# Patient Record
Sex: FEMALE | Race: OTHER
[De-identification: names, ages, dates, MRNs, and addresses within clinical notes are randomized per-mention and may not be internally consistent; named-entity substitution may affect disease eponyms.]

---

## 2021-02-10 ENCOUNTER — HOSPITAL ENCOUNTER (EMERGENCY)
Dept: HOSPITAL 72 - EMR | Age: 31
Discharge: HOME | End: 2021-02-10
Payer: MEDICARE

## 2021-02-10 VITALS — BODY MASS INDEX: 22.16 KG/M2 | HEIGHT: 65 IN | WEIGHT: 133 LBS

## 2021-02-10 VITALS — SYSTOLIC BLOOD PRESSURE: 129 MMHG | DIASTOLIC BLOOD PRESSURE: 89 MMHG

## 2021-02-10 DIAGNOSIS — N83.201: Primary | ICD-10-CM

## 2021-02-10 DIAGNOSIS — D72.829: ICD-10-CM

## 2021-02-10 LAB
ADD MANUAL DIFF: NO
ALBUMIN SERPL-MCNC: 4.1 G/DL (ref 3.4–5)
ALBUMIN/GLOB SERPL: 1.2 {RATIO} (ref 1–2.7)
ALP SERPL-CCNC: 68 U/L (ref 46–116)
ALT SERPL-CCNC: 44 U/L (ref 12–78)
ANION GAP SERPL CALC-SCNC: 9 MMOL/L (ref 5–15)
APPEARANCE UR: (no result)
APTT PPP: (no result) S
AST SERPL-CCNC: 22 U/L (ref 15–37)
BASOPHILS NFR BLD AUTO: 0.3 % (ref 0–2)
BILIRUB SERPL-MCNC: 0.5 MG/DL (ref 0.2–1)
BUN SERPL-MCNC: 8 MG/DL (ref 7–18)
CALCIUM SERPL-MCNC: 8.6 MG/DL (ref 8.5–10.1)
CHLORIDE SERPL-SCNC: 106 MMOL/L (ref 98–107)
CO2 SERPL-SCNC: 27 MMOL/L (ref 21–32)
CREAT SERPL-MCNC: 0.6 MG/DL (ref 0.55–1.3)
EOSINOPHIL NFR BLD AUTO: 0.8 % (ref 0–3)
ERYTHROCYTE [DISTWIDTH] IN BLOOD BY AUTOMATED COUNT: 12.9 % (ref 11.6–14.8)
GLOBULIN SER-MCNC: 3.5 G/DL
GLUCOSE UR STRIP-MCNC: NEGATIVE MG/DL
HCT VFR BLD CALC: 41.6 % (ref 37–47)
HGB BLD-MCNC: 13.6 G/DL (ref 12–16)
KETONES UR QL STRIP: NEGATIVE
LEUKOCYTE ESTERASE UR QL STRIP: NEGATIVE
LYMPHOCYTES NFR BLD AUTO: 8 % (ref 20–45)
MCV RBC AUTO: 87 FL (ref 80–99)
MONOCYTES NFR BLD AUTO: 4.7 % (ref 1–10)
NEUTROPHILS NFR BLD AUTO: 86.2 % (ref 45–75)
NITRITE UR QL STRIP: NEGATIVE
PH UR STRIP: 6 [PH] (ref 4.5–8)
PLATELET # BLD: 229 K/UL (ref 150–450)
POTASSIUM SERPL-SCNC: 3.6 MMOL/L (ref 3.5–5.1)
PROT UR QL STRIP: NEGATIVE
RBC # BLD AUTO: 4.8 M/UL (ref 4.2–5.4)
SODIUM SERPL-SCNC: 142 MMOL/L (ref 136–145)
SP GR UR STRIP: 1.01 (ref 1–1.03)
UROBILINOGEN UR-MCNC: NORMAL MG/DL (ref 0–1)
WBC # BLD AUTO: 19.4 K/UL (ref 4.8–10.8)

## 2021-02-10 PROCEDURE — 80053 COMPREHEN METABOLIC PANEL: CPT

## 2021-02-10 PROCEDURE — 81003 URINALYSIS AUTO W/O SCOPE: CPT

## 2021-02-10 PROCEDURE — 80307 DRUG TEST PRSMV CHEM ANLYZR: CPT

## 2021-02-10 PROCEDURE — 83690 ASSAY OF LIPASE: CPT

## 2021-02-10 PROCEDURE — 36415 COLL VENOUS BLD VENIPUNCTURE: CPT

## 2021-02-10 PROCEDURE — 76856 US EXAM PELVIC COMPLETE: CPT

## 2021-02-10 PROCEDURE — 83735 ASSAY OF MAGNESIUM: CPT

## 2021-02-10 PROCEDURE — 99284 EMERGENCY DEPT VISIT MOD MDM: CPT

## 2021-02-10 PROCEDURE — 96374 THER/PROPH/DIAG INJ IV PUSH: CPT

## 2021-02-10 PROCEDURE — 96361 HYDRATE IV INFUSION ADD-ON: CPT

## 2021-02-10 PROCEDURE — 74177 CT ABD & PELVIS W/CONTRAST: CPT

## 2021-02-10 PROCEDURE — 85025 COMPLETE CBC W/AUTO DIFF WBC: CPT

## 2021-02-10 PROCEDURE — 96375 TX/PRO/DX INJ NEW DRUG ADDON: CPT

## 2021-02-10 PROCEDURE — 76830 TRANSVAGINAL US NON-OB: CPT

## 2021-02-10 PROCEDURE — 81025 URINE PREGNANCY TEST: CPT

## 2021-02-10 NOTE — DIAGNOSTIC IMAGING REPORT
Indication: Pelvic pain, pregnancy test pending

 

Technique: Transabdominal and transvaginal images of the pelvis Doppler interrogation

of the ovaries

 

Comparison: none

 

Findings: Uterus is retroverted, measures 6.9 cm length by 3.6 cm AP. No intrauterine

pregnancy demonstrated. 9 mm thick endometrium. The ovaries are normal in size and

configuration, demonstrate normal Doppler signal. There is equivocal trace free

cul-de-sac fluid

 

Impression: No intrauterine pregnancy demonstrated. Should pregnancy test result in

being positive, differential considerations include very early pregnancy, spontaneous

, ectopic pregnancy.

 

Otherwise essentially unremarkable exam

## 2021-02-10 NOTE — EMERGENCY ROOM REPORT
History of Present Illness


General


Chief Complaint:  Abdominal Pain


Source:  Patient





Present Illness


HPI


Patient is a 30-year-old female denies any significant past medical history who 

presents to the ER co abdominal pain, nausea, non-bloody vomitus x 2 days.  She 

states that the pain is on the right lower side and is worried that it is coming

from her ovaries.  She denies fever but co chills.  She co mild generalized HA, 

no blurry vision, no focal weakness.  Denies chest pain, sob, or cough.  She 

denies any sick contacts.  LMP 2 weeks ago.  Denies dysuria or hematuria.   HO 

appendectomy.


Allergies:  


Coded Allergies:  


     No Known Allergies (Unverified , 2/10/21)





COVID-19 Screening


Contact w/high risk pt:  No


Experienced COVID-19 symptoms?:  Yes


COVID-19 Testing performed PTA:  Yes


COVID-19 Screening:  Negative COVID-19


COVID-19 Testing Source:  nasal





Patient History


Last Menstrual Period:  last week


Pregnant Now:  No


Reviewed Nursing Documentation:  PMH: Agreed; PSxH: Agreed





Nursing Documentation-PMH


Past Medical History:  No Stated History





Review of Systems


All Other Systems:  negative except mentioned in HPI





Physical Exam





Vital Signs








  Date Time  Temp Pulse Resp B/P (MAP) Pulse Ox O2 Delivery O2 Flow Rate FiO2


 


2/10/21 14:09 98.8 105 21 129/89 (102) 95 Room Air  








Sp02 EP Interpretation:  reviewed, normal


General Appearance:  alert, GCS 15, non-toxic, mild distress, other - tearful


Head:  normocephalic, atraumatic


Eyes:  bilateral eye normal inspection, bilateral eye PERRL


ENT:  hearing grossly normal, normal pharynx, no angioedema, normal voice


Neck:  full range of motion, supple/symm/no masses


Respiratory:  chest non-tender, lungs clear, normal breath sounds, speaking full

sentences


Cardiovascular #1:  regular rate, rhythm, no edema


Gastrointestinal:  other - Right suprapubic tenderness to palpation with no 

guarding or rebound tenderness


Rectal:  deferred


Musculoskeletal:  normal range of motion


Neurologic:  CNs III-XII nml as tested, oriented x3


Psychiatric:  no suicidal/homicidal ideation


Skin:  no rash


Lymphatic:  no adenopathy





Medical Decision Making


Diagnostic Impression:  


   Primary Impression:  


   Ovarian cyst


   Additional Impression:  


   Leukocytosis


ER Course


Patient presents with lower abdominal pain and leukocytosis.  Ultrasound 

demonstrates ovarian cyst no evidence for torsion.  Patient has free fluid 

within the cul-de-sac.  Likely ruptured cyst causing patient's pain.  Patient 

given IV pain medication as well as antiemetics.  Patient advised to follow-up 

with OB/GYN.  After discussing risks and benefits of further diagnostics, 

treatment plans, as well as indications for and risks of admission, the patient 

is agreeable to being discharged home.  I have explained that their evaluation 

and treatment in the emergency department today is an important step towards 

them achieving better health but that their evaluation today is not intended to 

replace further evaluation and treatment by a physician in their local clinic.  

I have explained that while the current findings suggest no immediate life 

threatening emergency they will require further evaluation and treatment by a 

physician of their choice in their area.  They understand that it will be 

necessary for them to review the final reports of their ED visit with their 

clinic physician.  We have reviewed indications for return to the Emergency 

Department.  I have explained that additional time may need to pass and/or 

additional testing as an outpatient may be necessary before a definitive 

diagnosis can be made.  They tell me they are willing to follow up as instructed

within the timeframe I recommend.  They appear to understand what we discussed. 

Additionally they understand that if they are unable to be seen by an outpatient

physician they are welcome, and in fact should, return to the Emergency 

Department for a repeat evaluation.  The patient is stable at time of discharge.





Laboratory Tests








Test


 2/10/21


14:20


 


White Blood Count


 19.4 K/UL


(4.8-10.8)  H


 


Red Blood Count


 4.80 M/UL


(4.20-5.40)


 


Hemoglobin


 13.6 G/DL


(12.0-16.0)


 


Hematocrit


 41.6 %


(37.0-47.0)


 


Mean Corpuscular Volume 87 FL (80-99)  


 


Mean Corpuscular Hemoglobin


 28.3 PG


(27.0-31.0)


 


Mean Corpuscular Hemoglobin


Concent 32.7 G/DL


(32.0-36.0)


 


Red Cell Distribution Width


 12.9 %


(11.6-14.8)


 


Platelet Count


 229 K/UL


(150-450)


 


Mean Platelet Volume


 10.1 FL


(6.5-10.1)


 


Neutrophils (%) (Auto)


 86.2 %


(45.0-75.0)  H


 


Lymphocytes (%) (Auto)


 8.0 %


(20.0-45.0)  L


 


Monocytes (%) (Auto)


 4.7 %


(1.0-10.0)


 


Eosinophils (%) (Auto)


 0.8 %


(0.0-3.0)


 


Basophils (%) (Auto)


 0.3 %


(0.0-2.0)


 


Urine Color Pale yellow  


 


Urine Appearance


 Slightly


cloudy


 


Urine pH 6 (4.5-8.0)  


 


Urine Specific Gravity


 1.010


(1.005-1.035)


 


Urine Protein


 Negative


(NEGATIVE)


 


Urine Glucose (UA)


 Negative


(NEGATIVE)


 


Urine Ketones


 Negative


(NEGATIVE)


 


Urine Blood


 2+ (NEGATIVE)


H


 


Urine Nitrite


 Negative


(NEGATIVE)


 


Urine Bilirubin


 Negative


(NEGATIVE)


 


Urine Urobilinogen


 Normal MG/DL


(0.0-1.0)


 


Urine Leukocyte Esterase


 Negative


(NEGATIVE)


 


Urine RBC


 0-2 /HPF (0 -


2)


 


Urine WBC


 0 /HPF (0 - 2)





 


Urine Squamous Epithelial


Cells Few /LPF


(NONE/OCC)


 


Urine Bacteria


 Few /HPF


(NONE)


 


Urine HCG, Qualitative


 Negative


(NEGATIVE)


 


Sodium Level


 142 MMOL/L


(136-145)


 


Potassium Level


 3.6 MMOL/L


(3.5-5.1)


 


Chloride Level


 106 MMOL/L


()


 


Carbon Dioxide Level


 27 MMOL/L


(21-32)


 


Anion Gap


 9 mmol/L


(5-15)


 


Blood Urea Nitrogen


 8 mg/dL (7-18)





 


Creatinine


 0.6 MG/DL


(0.55-1.30)


 


Estimated Glomerular


Filtration Rate > 60 mL/min


(>60)


 


Glucose Level


 84 MG/DL


()


 


Calcium Level


 8.6 MG/DL


(8.5-10.1)


 


Magnesium Level


 1.8 MG/DL


(1.8-2.4)


 


Total Bilirubin


 0.5 MG/DL


(0.2-1.0)


 


Aspartate Amino Transferase


(AST) 22 U/L (15-37)





 


Alanine Aminotransferase (ALT)


 44 U/L (12-78)





 


Alkaline Phosphatase


 68 U/L


()


 


Total Protein


 7.6 G/DL


(6.4-8.2)


 


Albumin


 4.1 G/DL


(3.4-5.0)


 


Globulin 3.5 g/dL  


 


Albumin/Globulin Ratio 1.2 (1.0-2.7)  


 


Lipase


 196 U/L


()


 


Urine Opiates Screen


 Negative


(NEGATIVE)


 


Urine Barbiturates Screen


 Negative


(NEGATIVE)


 


Phencyclidine (PCP) Screen


 Negative


(NEGATIVE)


 


Urine Amphetamines Screen


 Negative


(NEGATIVE)


 


Urine Benzodiazepines Screen


 Negative


(NEGATIVE)


 


Urine Cocaine Screen


 Negative


(NEGATIVE)


 


Urine Marijuana (THC) Screen


 Positive


(NEGATIVE)  H








Rhythm Strip Diag. Results


Rhythm Strip Time:  14:33


EP Interpretation:  yes - Lalitha Quach MD


Rate:  79 bpm


Rhythm:  NSR, no PVC's, no ectopy





Last Vital Signs








  Date Time  Temp Pulse Resp B/P (MAP) Pulse Ox O2 Delivery O2 Flow Rate FiO2


 


2/10/21 14:09 98.8 105 21 129/89 (102) 95 Room Air  








Disposition:  HOME, SELF-CARE


Condition:  Stable


Scripts


Oxycodone/Acetaminophen 5-325* (PERCOCET 5-325 MG TABLET*) 1 Each Tablet


1 TAB ORAL Q4H PRN for For Pain, #10 TAB 0 Refills


   Prov: Lalitha Quach M.D.         2/10/21 


Ondansetron* (ZOFRAN*) 4 Mg Tablet


4 MG ORAL Q6H PRN for Nausea & Vomiting, #14 TAB


   Prov: Lalitha Quach M.D.         2/10/21 


Ibuprofen* (MOTRIN*) 600 Mg Tablet


600 MG ORAL FOUR TIMES A DAY, #30 TAB 0 Refills


   Prov: Lalitha Quach M.D.         2/10/21


Referrals:  


[facility]ERR[facility] (PCP)





Additional Instructions:  


The patient was provided with discharge instructions, notified to follow-up with

a primary care doctor and or specialist in the next 24-48 hours, and to return 

to the ED if they have worsening of their symptoms. 





Please note that this report is being documented using DRAGON technology.


This can lead to erroneous entry secondary to incorrect interpretation by the 

dictating instrument.











Lalitha Quach M.D.        Feb 10, 2021 14:33

## 2021-02-10 NOTE — DIAGNOSTIC IMAGING REPORT
Clinical Indication: Nausea, vomiting, diarrhea, flank pain

 

Technique:   No oral contrast utilized, per emergency room physician request  IV

administration nonionic contrast. Venous phase spiral acquisition obtained through

the abdomen and pelvis. Multiplanar reconstructions were generated. Total dose length

product 218 mGycm. CTDIvol(s) 4 mGy. Dose reduction achieved using automated exposure

control

 

 

Comparison: none

 

Findings: Lack of enteric contrast limits assessment of the GI tract. The appendix is

visualized, but no findings to suggest acute appendicitis are evident. No evidence of

diverticulosis or diverticulitis. There is trace free pelvic fluid. No small bowel

distention. There is questionable mild wall thickening of the duodenum. A single

focally prominent segment of small bowel with slight wall thickening is seen in the

left upper quadrant, and there is very questionable mild wall thickening of the

proximal jejunum overall. No free intraperitoneal gas. Distal esophagus and stomach

are unremarkable.

 

The liver, gallbladder, bile ducts, pancreas, spleen, adrenals, left kidney are

unremarkable. Subcentimeter low-attenuation lesion is seen in the right kidney which

is too small to characterize. No renal or ureteral calculi, hydronephrosis, or

hydroureter. Normal uterus and ovaries. No pelvic mass or adenopathy. Unremarkable

bladder.

 

The included lung bases are clear. The bones are unremarkable.

 

Impression: Limited assessment of the GI tract, due to lack of enteric contrast

administration

 

Equivocal mild wall thickening of the duodenum and proximal jejunum; if real, could

indicate duodenitis/enteritis

 

Trace free pelvic fluid, presumably physiologic

 

Subcentimeter low-attenuation right renal lesion, too small to characterize, most

likely benign simple cysts. No further follow-up necessary

 

 

The CT scanner at [facility] is accredited by the [employer]

Radiology and the scans are performed using protocols designed to limit radiation

exposure to as low as reasonably achievable to attain images of sufficient resolution

adequate for diagnostic evaluation.

## 2021-02-10 NOTE — NUR
Patient reports that her right lower anterior flank started hurting baout 8/9 
am this morning. The pain and crammping has increased since. Reported to have 
had nausea, vomiting and diarrhea. Did not have vomiting or diarrhea since 
coming in to ER. Medicated for pain and nausea. tolerated well. AAOX4. 
Ambulatory. Independent

## 2022-08-20 ENCOUNTER — HOSPITAL ENCOUNTER (EMERGENCY)
Dept: HOSPITAL 87 - ER | Age: 32
LOS: 1 days | Discharge: HOME | End: 2022-08-21
Payer: MEDICAID

## 2022-08-20 VITALS — BODY MASS INDEX: 23.14 KG/M2 | HEIGHT: 65 IN | WEIGHT: 138.89 LBS

## 2022-08-20 VITALS — SYSTOLIC BLOOD PRESSURE: 112 MMHG | DIASTOLIC BLOOD PRESSURE: 73 MMHG

## 2022-08-20 DIAGNOSIS — Z90.49: ICD-10-CM

## 2022-08-20 DIAGNOSIS — K08.89: ICD-10-CM

## 2022-08-20 DIAGNOSIS — H92.01: ICD-10-CM

## 2022-08-20 DIAGNOSIS — R51.9: Primary | ICD-10-CM

## 2022-08-20 DIAGNOSIS — Z79.899: ICD-10-CM

## 2022-08-20 LAB
BASOPHILS NFR BLD AUTO: 0.3 % (ref 0–2)
CHLORIDE SERPL-SCNC: 107 MEQ/L (ref 98–107)
EOSINOPHIL NFR BLD AUTO: 1.3 % (ref 0–5)
ERYTHROCYTE [DISTWIDTH] IN BLOOD BY AUTOMATED COUNT: 13.3 % (ref 11.6–14.6)
HCG SERPL QL: NEGATIVE
HCT VFR BLD AUTO: 40.9 % (ref 36–48)
HGB BLD-MCNC: 13.6 G/DL (ref 12–16)
LYMPHOCYTES NFR BLD AUTO: 20.5 % (ref 20–50)
MCH RBC QN AUTO: 28.2 PG (ref 28–32)
MCV RBC AUTO: 85 FL (ref 81–99)
MONOCYTES NFR BLD AUTO: 4.9 % (ref 2–8)
NEUTROPHILS NFR BLD AUTO: 73 % (ref 40–76)
PLATELET # BLD AUTO: 278 X1000/UL (ref 130–400)
PMV BLD AUTO: 9.5 FL (ref 7.4–10.4)
RBC # BLD AUTO: 4.81 MILL/UL (ref 4.2–5.4)

## 2022-08-20 PROCEDURE — 85025 COMPLETE CBC W/AUTO DIFF WBC: CPT

## 2022-08-20 PROCEDURE — 71045 X-RAY EXAM CHEST 1 VIEW: CPT

## 2022-08-20 PROCEDURE — 84703 CHORIONIC GONADOTROPIN ASSAY: CPT

## 2022-08-20 PROCEDURE — 80053 COMPREHEN METABOLIC PANEL: CPT

## 2022-08-20 PROCEDURE — 96374 THER/PROPH/DIAG INJ IV PUSH: CPT

## 2022-08-20 PROCEDURE — 96375 TX/PRO/DX INJ NEW DRUG ADDON: CPT

## 2022-08-20 PROCEDURE — 99284 EMERGENCY DEPT VISIT MOD MDM: CPT

## 2022-08-20 PROCEDURE — 36415 COLL VENOUS BLD VENIPUNCTURE: CPT
